# Patient Record
Sex: MALE | Race: WHITE | NOT HISPANIC OR LATINO | Employment: OTHER | URBAN - METROPOLITAN AREA
[De-identification: names, ages, dates, MRNs, and addresses within clinical notes are randomized per-mention and may not be internally consistent; named-entity substitution may affect disease eponyms.]

---
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
## 2023-11-21 RX ORDER — ACETAMINOPHEN 500 MG
500-1000 TABLET ORAL EVERY 6 HOURS PRN
Status: ON HOLD | COMMUNITY
End: 2023-12-01

## 2023-11-21 RX ORDER — FINASTERIDE 5 MG/1
5 TABLET, FILM COATED ORAL DAILY
COMMUNITY
End: 2023-11-21

## 2023-11-21 RX ORDER — PROPRANOLOL HCL 60 MG
60 CAPSULE, EXTENDED RELEASE 24HR ORAL DAILY
COMMUNITY

## 2023-11-21 RX ORDER — OLOPATADINE HYDROCHLORIDE 2 MG/ML
1 SOLUTION/ DROPS OPHTHALMIC DAILY
COMMUNITY
End: 2023-11-21

## 2023-11-21 RX ORDER — TIMOLOL MALEATE 5 MG/ML
1 SOLUTION/ DROPS OPHTHALMIC DAILY
COMMUNITY

## 2023-11-21 RX ORDER — FUROSEMIDE 20 MG
20 TABLET ORAL DAILY PRN
COMMUNITY

## 2023-11-21 RX ORDER — TAMSULOSIN HYDROCHLORIDE 0.4 MG/1
0.4 CAPSULE ORAL EVERY EVENING
COMMUNITY

## 2023-11-21 RX ORDER — ROSUVASTATIN CALCIUM 20 MG/1
20 TABLET, COATED ORAL DAILY
COMMUNITY
End: 2023-11-21

## 2023-11-21 NOTE — PROVIDER NOTIFICATION
Scheduled for left total knee arthroplasty 11/30/23 with Dr. Montero at [facility].    Informed Dr. Montero's team that Patient reports a small blister on his right toe that he has had for 1 week. His surgery is on the left knee and the blister is on the right foot. Dr. Montero will proceed with surgery as planned.    Annita Turner RN

## 2023-11-21 NOTE — PROGRESS NOTES
"Planning to discharge home on POD 1 in the morning with his son staying with him after surgery. He also lives with his wife.       11/21/23 1411   Discharge Planning   Patient/Family Anticipates Transition to home with family   Concerns to be Addressed all concerns addressed in this encounter   Living Arrangements   People in Home spouse   Type of Residence Private Residence   Is your private residence a single family home or apartment? Apartment  (independent senior apt)   Number of Stairs, Within Home, Primary none  (elevator)   Once home, are you able to live on one level? Yes   Which level? Main Level   Bathroom Shower/Tub Walk-in shower  (Have both tub and walk-in shower)   Equipment Currently Used at Home cane, straight;grab bar, tub/shower;grab bar, toilet;shower chair  (Has walkers at home)   Support System   Support Systems Spouse/Significant Other;Children  (wife, Yoly, and son, \"Pip\" Orville, and daughter, Annita. His son will be coming from out of state to stay with them after surgery.)   Do you have someone available to stay with you one or two nights once you are home? Yes   Education   Patient attended total joint pre-op class/received pre-op teaching  email/phone call       "

## 2023-11-30 ENCOUNTER — HOSPITAL ENCOUNTER (OUTPATIENT)
Facility: CLINIC | Age: 88
Discharge: HOME | End: 2023-12-01
Attending: STUDENT IN AN ORGANIZED HEALTH CARE EDUCATION/TRAINING PROGRAM | Admitting: STUDENT IN AN ORGANIZED HEALTH CARE EDUCATION/TRAINING PROGRAM
Payer: COMMERCIAL

## 2023-11-30 ENCOUNTER — APPOINTMENT (OUTPATIENT)
Dept: RADIOLOGY | Facility: CLINIC | Age: 88
End: 2023-11-30
Attending: STUDENT IN AN ORGANIZED HEALTH CARE EDUCATION/TRAINING PROGRAM
Payer: COMMERCIAL

## 2023-11-30 ENCOUNTER — ANESTHESIA (OUTPATIENT)
Dept: SURGERY | Facility: CLINIC | Age: 88
End: 2023-11-30
Payer: COMMERCIAL

## 2023-11-30 ENCOUNTER — ANESTHESIA EVENT (OUTPATIENT)
Dept: SURGERY | Facility: CLINIC | Age: 88
End: 2023-11-30
Payer: COMMERCIAL

## 2023-11-30 DIAGNOSIS — Z96.652 S/P TOTAL KNEE REPLACEMENT, LEFT: Primary | ICD-10-CM

## 2023-11-30 LAB — POTASSIUM SERPL-SCNC: 3.7 MMOL/L (ref 3.4–5.3)

## 2023-11-30 PROCEDURE — 250N000013 HC RX MED GY IP 250 OP 250 PS 637: Performed by: STUDENT IN AN ORGANIZED HEALTH CARE EDUCATION/TRAINING PROGRAM

## 2023-11-30 PROCEDURE — 999N000141 HC STATISTIC PRE-PROCEDURE NURSING ASSESSMENT: Performed by: STUDENT IN AN ORGANIZED HEALTH CARE EDUCATION/TRAINING PROGRAM

## 2023-11-30 PROCEDURE — 360N000077 HC SURGERY LEVEL 4, PER MIN: Performed by: STUDENT IN AN ORGANIZED HEALTH CARE EDUCATION/TRAINING PROGRAM

## 2023-11-30 PROCEDURE — 250N000011 HC RX IP 250 OP 636: Performed by: ANESTHESIOLOGY

## 2023-11-30 PROCEDURE — 258N000003 HC RX IP 258 OP 636: Performed by: ANESTHESIOLOGY

## 2023-11-30 PROCEDURE — 250N000011 HC RX IP 250 OP 636: Performed by: PHYSICIAN ASSISTANT

## 2023-11-30 PROCEDURE — 258N000003 HC RX IP 258 OP 636: Performed by: STUDENT IN AN ORGANIZED HEALTH CARE EDUCATION/TRAINING PROGRAM

## 2023-11-30 PROCEDURE — C1713 ANCHOR/SCREW BN/BN,TIS/BN: HCPCS | Performed by: STUDENT IN AN ORGANIZED HEALTH CARE EDUCATION/TRAINING PROGRAM

## 2023-11-30 PROCEDURE — 250N000009 HC RX 250: Performed by: ANESTHESIOLOGY

## 2023-11-30 PROCEDURE — 84132 ASSAY OF SERUM POTASSIUM: CPT | Performed by: NURSE PRACTITIONER

## 2023-11-30 PROCEDURE — 250N000009 HC RX 250: Performed by: STUDENT IN AN ORGANIZED HEALTH CARE EDUCATION/TRAINING PROGRAM

## 2023-11-30 PROCEDURE — 258N000001 HC RX 258: Performed by: STUDENT IN AN ORGANIZED HEALTH CARE EDUCATION/TRAINING PROGRAM

## 2023-11-30 PROCEDURE — 250N000011 HC RX IP 250 OP 636: Mod: JZ | Performed by: NURSE ANESTHETIST, CERTIFIED REGISTERED

## 2023-11-30 PROCEDURE — 73560 X-RAY EXAM OF KNEE 1 OR 2: CPT | Mod: LT

## 2023-11-30 PROCEDURE — 36415 COLL VENOUS BLD VENIPUNCTURE: CPT | Performed by: NURSE PRACTITIONER

## 2023-11-30 PROCEDURE — 250N000011 HC RX IP 250 OP 636: Mod: JZ | Performed by: STUDENT IN AN ORGANIZED HEALTH CARE EDUCATION/TRAINING PROGRAM

## 2023-11-30 PROCEDURE — 370N000017 HC ANESTHESIA TECHNICAL FEE, PER MIN: Performed by: STUDENT IN AN ORGANIZED HEALTH CARE EDUCATION/TRAINING PROGRAM

## 2023-11-30 PROCEDURE — C1776 JOINT DEVICE (IMPLANTABLE): HCPCS | Performed by: STUDENT IN AN ORGANIZED HEALTH CARE EDUCATION/TRAINING PROGRAM

## 2023-11-30 PROCEDURE — 710N000010 HC RECOVERY PHASE 1, LEVEL 2, PER MIN: Performed by: STUDENT IN AN ORGANIZED HEALTH CARE EDUCATION/TRAINING PROGRAM

## 2023-11-30 PROCEDURE — 258N000003 HC RX IP 258 OP 636: Performed by: NURSE ANESTHETIST, CERTIFIED REGISTERED

## 2023-11-30 PROCEDURE — 999N000065 XR KNEE PORT LEFT 1/2 VIEWS

## 2023-11-30 PROCEDURE — 250N000011 HC RX IP 250 OP 636: Mod: JZ | Performed by: ANESTHESIOLOGY

## 2023-11-30 PROCEDURE — 272N000001 HC OR GENERAL SUPPLY STERILE: Performed by: STUDENT IN AN ORGANIZED HEALTH CARE EDUCATION/TRAINING PROGRAM

## 2023-11-30 PROCEDURE — 99222 1ST HOSP IP/OBS MODERATE 55: CPT | Mod: AI | Performed by: INTERNAL MEDICINE

## 2023-11-30 PROCEDURE — 250N000009 HC RX 250: Performed by: PHYSICIAN ASSISTANT

## 2023-11-30 PROCEDURE — 250N000013 HC RX MED GY IP 250 OP 250 PS 637: Performed by: PHYSICIAN ASSISTANT

## 2023-11-30 DEVICE — ATTUNE PATELLA MEDIALIZED DOME 38MM CEMENTED AOX
Type: IMPLANTABLE DEVICE | Site: KNEE | Status: FUNCTIONAL
Brand: ATTUNE

## 2023-11-30 DEVICE — ATTUNE KNEE SYSTEM FEMORAL POSTERIOR STABILIZED SIZE 7 LEFT CEMENTED
Type: IMPLANTABLE DEVICE | Site: KNEE | Status: FUNCTIONAL
Brand: ATTUNE

## 2023-11-30 DEVICE — FULL DOSE BONE CEMENT, 10 PACK CATALOG NUMBER IS 6191-1-010
Type: IMPLANTABLE DEVICE | Site: KNEE | Status: FUNCTIONAL
Brand: SIMPLEX

## 2023-11-30 DEVICE — ATTUNE KNEE SYSTEM TIBIAL BASE FIXED BEARING SIZE 7 CEMENTED
Type: IMPLANTABLE DEVICE | Site: KNEE | Status: FUNCTIONAL
Brand: ATTUNE

## 2023-11-30 RX ORDER — ASPIRIN 325 MG
325 TABLET, DELAYED RELEASE (ENTERIC COATED) ORAL DAILY
Status: DISCONTINUED | OUTPATIENT
Start: 2023-11-30 | End: 2023-12-01 | Stop reason: HOSPADM

## 2023-11-30 RX ORDER — CEFAZOLIN SODIUM 2 G/100ML
2 INJECTION, SOLUTION INTRAVENOUS EVERY 8 HOURS
Qty: 200 ML | Refills: 0 | Status: COMPLETED | OUTPATIENT
Start: 2023-11-30 | End: 2023-12-01

## 2023-11-30 RX ORDER — AMOXICILLIN 250 MG
1 CAPSULE ORAL 2 TIMES DAILY
Status: DISCONTINUED | OUTPATIENT
Start: 2023-11-30 | End: 2023-12-01 | Stop reason: HOSPADM

## 2023-11-30 RX ORDER — NALOXONE HYDROCHLORIDE 0.4 MG/ML
0.2 INJECTION, SOLUTION INTRAMUSCULAR; INTRAVENOUS; SUBCUTANEOUS
Status: DISCONTINUED | OUTPATIENT
Start: 2023-11-30 | End: 2023-12-01 | Stop reason: HOSPADM

## 2023-11-30 RX ORDER — ONDANSETRON 2 MG/ML
4 INJECTION INTRAMUSCULAR; INTRAVENOUS EVERY 6 HOURS PRN
Status: DISCONTINUED | OUTPATIENT
Start: 2023-11-30 | End: 2023-12-01 | Stop reason: HOSPADM

## 2023-11-30 RX ORDER — CEFAZOLIN SODIUM/WATER 2 G/20 ML
2 SYRINGE (ML) INTRAVENOUS
Status: DISCONTINUED | OUTPATIENT
Start: 2023-11-30 | End: 2023-11-30

## 2023-11-30 RX ORDER — BISACODYL 10 MG
10 SUPPOSITORY, RECTAL RECTAL DAILY PRN
Status: DISCONTINUED | OUTPATIENT
Start: 2023-11-30 | End: 2023-12-01 | Stop reason: HOSPADM

## 2023-11-30 RX ORDER — OXYCODONE HYDROCHLORIDE 5 MG/1
5 TABLET ORAL EVERY 4 HOURS PRN
Qty: 20 TABLET | Refills: 0 | Status: SHIPPED | OUTPATIENT
Start: 2023-11-30

## 2023-11-30 RX ORDER — HYDROMORPHONE HCL IN WATER/PF 6 MG/30 ML
0.2 PATIENT CONTROLLED ANALGESIA SYRINGE INTRAVENOUS
Status: DISCONTINUED | OUTPATIENT
Start: 2023-11-30 | End: 2023-12-01 | Stop reason: HOSPADM

## 2023-11-30 RX ORDER — LIDOCAINE 40 MG/G
CREAM TOPICAL
Status: DISCONTINUED | OUTPATIENT
Start: 2023-11-30 | End: 2023-11-30 | Stop reason: HOSPADM

## 2023-11-30 RX ORDER — FENTANYL CITRATE 50 UG/ML
50 INJECTION, SOLUTION INTRAMUSCULAR; INTRAVENOUS
Status: DISCONTINUED | OUTPATIENT
Start: 2023-11-30 | End: 2023-11-30 | Stop reason: HOSPADM

## 2023-11-30 RX ORDER — CEFAZOLIN SODIUM/WATER 2 G/20 ML
2 SYRINGE (ML) INTRAVENOUS SEE ADMIN INSTRUCTIONS
Status: DISCONTINUED | OUTPATIENT
Start: 2023-11-30 | End: 2023-11-30 | Stop reason: HOSPADM

## 2023-11-30 RX ORDER — SODIUM CHLORIDE, SODIUM LACTATE, POTASSIUM CHLORIDE, CALCIUM CHLORIDE 600; 310; 30; 20 MG/100ML; MG/100ML; MG/100ML; MG/100ML
INJECTION, SOLUTION INTRAVENOUS CONTINUOUS
Status: DISCONTINUED | OUTPATIENT
Start: 2023-11-30 | End: 2023-12-01 | Stop reason: HOSPADM

## 2023-11-30 RX ORDER — ACETAMINOPHEN 325 MG/1
975 TABLET ORAL EVERY 8 HOURS
Qty: 27 TABLET | Refills: 0 | Status: DISCONTINUED | OUTPATIENT
Start: 2023-11-30 | End: 2023-12-01 | Stop reason: HOSPADM

## 2023-11-30 RX ORDER — SODIUM CHLORIDE, SODIUM LACTATE, POTASSIUM CHLORIDE, CALCIUM CHLORIDE 600; 310; 30; 20 MG/100ML; MG/100ML; MG/100ML; MG/100ML
INJECTION, SOLUTION INTRAVENOUS CONTINUOUS
Status: DISCONTINUED | OUTPATIENT
Start: 2023-11-30 | End: 2023-11-30 | Stop reason: HOSPADM

## 2023-11-30 RX ORDER — TRANEXAMIC ACID 650 MG/1
1950 TABLET ORAL ONCE
Status: COMPLETED | OUTPATIENT
Start: 2023-11-30 | End: 2023-11-30

## 2023-11-30 RX ORDER — AMOXICILLIN 250 MG
1-2 CAPSULE ORAL 2 TIMES DAILY
Qty: 30 TABLET | Refills: 0 | Status: SHIPPED | OUTPATIENT
Start: 2023-11-30

## 2023-11-30 RX ORDER — TIMOLOL MALEATE 5 MG/ML
1 SOLUTION/ DROPS OPHTHALMIC DAILY
Status: DISCONTINUED | OUTPATIENT
Start: 2023-11-30 | End: 2023-12-01 | Stop reason: HOSPADM

## 2023-11-30 RX ORDER — NALOXONE HYDROCHLORIDE 0.4 MG/ML
0.4 INJECTION, SOLUTION INTRAMUSCULAR; INTRAVENOUS; SUBCUTANEOUS
Status: DISCONTINUED | OUTPATIENT
Start: 2023-11-30 | End: 2023-12-01 | Stop reason: HOSPADM

## 2023-11-30 RX ORDER — ONDANSETRON 4 MG/1
4 TABLET, ORALLY DISINTEGRATING ORAL EVERY 30 MIN PRN
Status: DISCONTINUED | OUTPATIENT
Start: 2023-11-30 | End: 2023-11-30 | Stop reason: HOSPADM

## 2023-11-30 RX ORDER — FENTANYL CITRATE 50 UG/ML
25 INJECTION, SOLUTION INTRAMUSCULAR; INTRAVENOUS EVERY 5 MIN PRN
Status: DISCONTINUED | OUTPATIENT
Start: 2023-11-30 | End: 2023-11-30 | Stop reason: HOSPADM

## 2023-11-30 RX ORDER — ACETAMINOPHEN 325 MG/1
650 TABLET ORAL EVERY 4 HOURS PRN
Status: DISCONTINUED | OUTPATIENT
Start: 2023-12-03 | End: 2023-12-01 | Stop reason: HOSPADM

## 2023-11-30 RX ORDER — DEXAMETHASONE SODIUM PHOSPHATE 10 MG/ML
INJECTION, SOLUTION INTRAMUSCULAR; INTRAVENOUS PRN
Status: DISCONTINUED | OUTPATIENT
Start: 2023-11-30 | End: 2023-11-30

## 2023-11-30 RX ORDER — FUROSEMIDE 20 MG
20 TABLET ORAL DAILY PRN
Status: DISCONTINUED | OUTPATIENT
Start: 2023-11-30 | End: 2023-12-01 | Stop reason: HOSPADM

## 2023-11-30 RX ORDER — TAMSULOSIN HYDROCHLORIDE 0.4 MG/1
0.4 CAPSULE ORAL EVERY EVENING
Status: DISCONTINUED | OUTPATIENT
Start: 2023-11-30 | End: 2023-12-01 | Stop reason: HOSPADM

## 2023-11-30 RX ORDER — HYDROMORPHONE HCL IN WATER/PF 6 MG/30 ML
0.2 PATIENT CONTROLLED ANALGESIA SYRINGE INTRAVENOUS EVERY 5 MIN PRN
Status: DISCONTINUED | OUTPATIENT
Start: 2023-11-30 | End: 2023-11-30 | Stop reason: HOSPADM

## 2023-11-30 RX ORDER — LIDOCAINE 40 MG/G
CREAM TOPICAL
Status: DISCONTINUED | OUTPATIENT
Start: 2023-11-30 | End: 2023-12-01 | Stop reason: HOSPADM

## 2023-11-30 RX ORDER — HYDROMORPHONE HCL IN WATER/PF 6 MG/30 ML
0.4 PATIENT CONTROLLED ANALGESIA SYRINGE INTRAVENOUS EVERY 5 MIN PRN
Status: DISCONTINUED | OUTPATIENT
Start: 2023-11-30 | End: 2023-11-30 | Stop reason: HOSPADM

## 2023-11-30 RX ORDER — METHOCARBAMOL 500 MG/1
500 TABLET, FILM COATED ORAL EVERY 6 HOURS PRN
Status: DISCONTINUED | OUTPATIENT
Start: 2023-11-30 | End: 2023-12-01 | Stop reason: HOSPADM

## 2023-11-30 RX ORDER — FENTANYL CITRATE 50 UG/ML
50 INJECTION, SOLUTION INTRAMUSCULAR; INTRAVENOUS EVERY 5 MIN PRN
Status: DISCONTINUED | OUTPATIENT
Start: 2023-11-30 | End: 2023-11-30 | Stop reason: HOSPADM

## 2023-11-30 RX ORDER — POLYETHYLENE GLYCOL 3350 17 G/17G
17 POWDER, FOR SOLUTION ORAL DAILY
Status: DISCONTINUED | OUTPATIENT
Start: 2023-12-01 | End: 2023-12-01 | Stop reason: HOSPADM

## 2023-11-30 RX ORDER — OXYCODONE HYDROCHLORIDE 5 MG/1
5 TABLET ORAL EVERY 4 HOURS PRN
Status: DISCONTINUED | OUTPATIENT
Start: 2023-11-30 | End: 2023-12-01 | Stop reason: HOSPADM

## 2023-11-30 RX ORDER — HYDROMORPHONE HCL IN WATER/PF 6 MG/30 ML
0.1 PATIENT CONTROLLED ANALGESIA SYRINGE INTRAVENOUS
Status: DISCONTINUED | OUTPATIENT
Start: 2023-11-30 | End: 2023-12-01 | Stop reason: HOSPADM

## 2023-11-30 RX ORDER — ASPIRIN 325 MG
325 TABLET, DELAYED RELEASE (ENTERIC COATED) ORAL DAILY
Qty: 40 TABLET | Refills: 0 | Status: SHIPPED | OUTPATIENT
Start: 2023-11-30

## 2023-11-30 RX ORDER — PROPRANOLOL HCL 60 MG
60 CAPSULE, EXTENDED RELEASE 24HR ORAL DAILY
Status: DISCONTINUED | OUTPATIENT
Start: 2023-11-30 | End: 2023-12-01 | Stop reason: HOSPADM

## 2023-11-30 RX ORDER — PROPOFOL 10 MG/ML
INJECTION, EMULSION INTRAVENOUS CONTINUOUS PRN
Status: DISCONTINUED | OUTPATIENT
Start: 2023-11-30 | End: 2023-11-30

## 2023-11-30 RX ORDER — ONDANSETRON 4 MG/1
4 TABLET, ORALLY DISINTEGRATING ORAL EVERY 6 HOURS PRN
Status: DISCONTINUED | OUTPATIENT
Start: 2023-11-30 | End: 2023-12-01 | Stop reason: HOSPADM

## 2023-11-30 RX ORDER — ACETAMINOPHEN 325 MG/1
650 TABLET ORAL EVERY 4 HOURS PRN
Qty: 100 TABLET | Refills: 0 | Status: SHIPPED | OUTPATIENT
Start: 2023-11-30

## 2023-11-30 RX ORDER — ONDANSETRON 2 MG/ML
4 INJECTION INTRAMUSCULAR; INTRAVENOUS EVERY 30 MIN PRN
Status: DISCONTINUED | OUTPATIENT
Start: 2023-11-30 | End: 2023-11-30 | Stop reason: HOSPADM

## 2023-11-30 RX ORDER — FENTANYL CITRATE 50 UG/ML
100 INJECTION, SOLUTION INTRAMUSCULAR; INTRAVENOUS
Status: DISCONTINUED | OUTPATIENT
Start: 2023-11-30 | End: 2023-11-30 | Stop reason: HOSPADM

## 2023-11-30 RX ORDER — ONDANSETRON 2 MG/ML
INJECTION INTRAMUSCULAR; INTRAVENOUS PRN
Status: DISCONTINUED | OUTPATIENT
Start: 2023-11-30 | End: 2023-11-30

## 2023-11-30 RX ORDER — HYDROXYZINE HYDROCHLORIDE 10 MG/1
10 TABLET, FILM COATED ORAL EVERY 6 HOURS PRN
Status: DISCONTINUED | OUTPATIENT
Start: 2023-11-30 | End: 2023-12-01 | Stop reason: HOSPADM

## 2023-11-30 RX ORDER — BUPIVACAINE HYDROCHLORIDE 5 MG/ML
INJECTION, SOLUTION EPIDURAL; INTRACAUDAL
Status: COMPLETED | OUTPATIENT
Start: 2023-11-30 | End: 2023-11-30

## 2023-11-30 RX ORDER — PROCHLORPERAZINE MALEATE 5 MG
5 TABLET ORAL EVERY 6 HOURS PRN
Status: DISCONTINUED | OUTPATIENT
Start: 2023-11-30 | End: 2023-12-01 | Stop reason: HOSPADM

## 2023-11-30 RX ADMIN — SENNOSIDES AND DOCUSATE SODIUM 1 TABLET: 8.6; 5 TABLET ORAL at 19:35

## 2023-11-30 RX ADMIN — TIMOLOL MALEATE 1 DROP: 5 SOLUTION OPHTHALMIC at 19:36

## 2023-11-30 RX ADMIN — TRANEXAMIC ACID 1950 MG: 650 TABLET ORAL at 13:14

## 2023-11-30 RX ADMIN — ONDANSETRON 4 MG: 2 INJECTION INTRAMUSCULAR; INTRAVENOUS at 15:11

## 2023-11-30 RX ADMIN — SODIUM CHLORIDE, POTASSIUM CHLORIDE, SODIUM LACTATE AND CALCIUM CHLORIDE: 600; 310; 30; 20 INJECTION, SOLUTION INTRAVENOUS at 15:54

## 2023-11-30 RX ADMIN — SODIUM CHLORIDE, POTASSIUM CHLORIDE, SODIUM LACTATE AND CALCIUM CHLORIDE: 600; 310; 30; 20 INJECTION, SOLUTION INTRAVENOUS at 23:28

## 2023-11-30 RX ADMIN — MIDAZOLAM HYDROCHLORIDE 0.5 MG: 1 INJECTION, SOLUTION INTRAMUSCULAR; INTRAVENOUS at 13:44

## 2023-11-30 RX ADMIN — FENTANYL CITRATE 25 MCG: 50 INJECTION, SOLUTION INTRAMUSCULAR; INTRAVENOUS at 13:43

## 2023-11-30 RX ADMIN — SODIUM CHLORIDE, POTASSIUM CHLORIDE, SODIUM LACTATE AND CALCIUM CHLORIDE: 600; 310; 30; 20 INJECTION, SOLUTION INTRAVENOUS at 13:14

## 2023-11-30 RX ADMIN — TAMSULOSIN HYDROCHLORIDE 0.4 MG: 0.4 CAPSULE ORAL at 19:35

## 2023-11-30 RX ADMIN — OXYCODONE HYDROCHLORIDE 5 MG: 5 TABLET ORAL at 19:36

## 2023-11-30 RX ADMIN — BUPIVACAINE HYDROCHLORIDE 15 ML: 5 INJECTION, SOLUTION EPIDURAL; INTRACAUDAL; PERINEURAL at 13:48

## 2023-11-30 RX ADMIN — PROPOFOL 50 MCG/KG/MIN: 10 INJECTION, EMULSION INTRAVENOUS at 14:55

## 2023-11-30 RX ADMIN — CEFAZOLIN SODIUM 2 G: 2 INJECTION, SOLUTION INTRAVENOUS at 21:59

## 2023-11-30 RX ADMIN — Medication 2 G: at 14:46

## 2023-11-30 RX ADMIN — PROPRANOLOL HYDROCHLORIDE 60 MG: 60 CAPSULE, EXTENDED RELEASE ORAL at 19:34

## 2023-11-30 RX ADMIN — ASPIRIN 325 MG: 325 TABLET, DELAYED RELEASE ORAL at 19:35

## 2023-11-30 RX ADMIN — LIDOCAINE HYDROCHLORIDE 30 MG: 10 INJECTION, SOLUTION EPIDURAL; INFILTRATION; INTRACAUDAL; PERINEURAL at 14:55

## 2023-11-30 RX ADMIN — DEXAMETHASONE SODIUM PHOSPHATE 4 MG: 10 INJECTION, SOLUTION INTRAMUSCULAR; INTRAVENOUS at 15:11

## 2023-11-30 RX ADMIN — PHENYLEPHRINE HYDROCHLORIDE 0.2 MCG/KG/MIN: 10 INJECTION INTRAVENOUS at 15:01

## 2023-11-30 RX ADMIN — MEPIVACAINE HYDROCHLORIDE 3 ML: 15 INJECTION, SOLUTION EPIDURAL; INFILTRATION at 14:45

## 2023-11-30 RX ADMIN — ACETAMINOPHEN 975 MG: 325 TABLET ORAL at 19:36

## 2023-11-30 ASSESSMENT — ACTIVITIES OF DAILY LIVING (ADL)
ADLS_ACUITY_SCORE: 18
ADLS_ACUITY_SCORE: 26
ADLS_ACUITY_SCORE: 24
ADLS_ACUITY_SCORE: 25

## 2023-11-30 NOTE — ANESTHESIA CARE TRANSFER NOTE
Patient: Gasper Houston    Procedure: Procedure(s):  LEFT TOTAL KNEE ARTHROPLASTY, AND HARDWARE REMOVAL       Diagnosis: Left knee pain [M25.562]  Osteoarthritis [M19.90]  Diagnosis Additional Information: No value filed.    Anesthesia Type:   Spinal     Note:    Oropharynx: spontaneously breathing  Level of Consciousness: awake  Oxygen Supplementation: room air    Independent Airway: airway patency satisfactory and stable  Dentition: dentition unchanged  Vital Signs Stable: post-procedure vital signs reviewed and stable  Report to RN Given: handoff report given  Patient transferred to: PACU    Handoff Report: Identifed the Patient, Identified the Reponsible Provider, Reviewed the pertinent medical history, Discussed the surgical course, Set expectations for post-procedure period and Allowed opportunity for questions and acknowledgement of understanding      Vitals:  Vitals Value Taken Time   /76 11/30/23 1713   Temp 97.1    Pulse 88 11/30/23 1716   Resp 32 11/30/23 1716   SpO2 100 % 11/30/23 1716   Vitals shown include unfiled device data.    Electronically Signed By: CARLOS CASSIDY CRNA  November 30, 2023  5:17 PM
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
## 2023-11-30 NOTE — ANESTHESIA PROCEDURE NOTES
"Intrathecal Procedure Note    Pre-Procedure   Staff -        Anesthesiologist:  Sarbjit Michelle MD       Performed By: anesthesiologist       Location: OR       Procedure Start/Stop Times: 11/30/2023 2:42 PM and 11/30/2023 2:45 PM       Pre-Anesthestic Checklist: patient identified, IV checked, risks and benefits discussed, informed consent, monitors and equipment checked, pre-op evaluation and at physician/surgeon's request  Timeout:       Correct Patient: Yes        Correct Procedure: Yes        Correct Site: Yes        Correct Position: Yes   Procedure Documentation  Procedure: intrathecal       Patient Position: sitting       Patient Prep/Sterile Barriers: sterile gloves, mask, patient draped       Skin prep: Chloraprep       Insertion Site: L3-4. (midline approach).       Needle Gauge: 25.        Needle Length (Inches): 3.5        Spinal Needle Type: Hanny tip       Introducer used       Introducer: 20 G       # of attempts: 2 and  # of redirects:     Assessment/Narrative         Paresthesias: No.       CSF fluid: clear.    Medication(s) Administered   1.5% Mepivacaine PF (Intrathecal) - Intrathecal   3 mL - 11/30/2023 2:45:00 PM  Medication Administration Time: 11/30/2023 2:42 PM      FOR [facility] ([facility] [place] ONLY:   Pain Team Contact information: please page the Pain Team Via [address]. Search \"Pain\". During daytime hours, please page the attending first. At night please page the resident first.      "

## 2023-11-30 NOTE — ANESTHESIA PROCEDURE NOTES
Adductor canal Procedure Note    Pre-Procedure   Staff -        Anesthesiologist:  Sarbjit Michelle MD       Performed By: anesthesiologist       Location: pre-op       Procedure Start/Stop Times: 11/30/2023 1:46 PM and 11/30/2023 1:48 PM       Pre-Anesthestic Checklist: patient identified, IV checked, site marked, risks and benefits discussed, informed consent, monitors and equipment checked, pre-op evaluation, at physician/surgeon's request and post-op pain management  Timeout:       Correct Patient: Yes        Correct Procedure: Yes        Correct Site: Yes        Correct Position: Yes        Correct Laterality: Yes        Site Marked: Yes  Procedure Documentation  Procedure: Adductor canal       Laterality: left       Patient Position: supine       Patient Prep/Sterile Barriers: sterile gloves, mask       Skin prep: Chloraprep       Needle Type: short bevel       Needle Gauge: 20.        Needle Length (Inches): 4        Ultrasound guided       1. Ultrasound was used to identify targeted nerve, plexus, vascular marker, or fascial plane and place a needle adjacent to it in real-time.       2. Ultrasound was used to visualize the spread of anesthetic in close proximity to the above referenced structure.       3. A permanent image is entered into the patient's record.       4. The visualized anatomic structures appeared normal.       5. There were no apparent abnormal pathologic findings.    Assessment/Narrative         The placement was positive for aspirated blood (on initial needle placement, resolved after repositioning).       The placement was negative for: painful injection and site bleeding       Paresthesias: No.       Bolus given via needle..        Secured via.        Insertion/Infusion Method: Single Shot       Complications: none       Injection made incrementally with aspirations every 5 mL.    Medication(s) Administered   Bupivacaine 0.5% PF (Infiltration) - Infiltration   15 mL - 11/30/2023  "1:48:00 PM  Medication Administration Time: 11/30/2023 1:46 PM      FOR [facility] ([facility]) ONLY:   Pain Team Contact information: please page the Pain Team Via [employer]. Search \"Pain\". During daytime hours, please page the attending first. At night please page the resident first.      "

## 2023-11-30 NOTE — PHARMACY-ADMISSION MEDICATION HISTORY
Pharmacist ASHA Medication History    Admission medication history is complete. The information provided in this note is only as accurate as the sources available at the time of the update.    Medication reconciliation/reorder completed by provider prior to medication history? No    Information Source(s): Patient and Clinic records and Care Everywhere via in-person    Pertinent Information: N/A    Medication Affordability:  Not including over the counter (OTC) medications, was there a time in the past 3 months when you did not take your medications as prescribed because of cost?: No    Allergies reviewed with patient and updates made in EHR: yes    Medications available for use during hospital stay: None.      Medication History Completed By: Tha Becerril [facility] 11/30/2023 1:05 PM    PTA Med List   Medication Sig Last Dose    acetaminophen (TYLENOL) 500 MG tablet Take 500-1,000 mg by mouth every 6 hours as needed for mild pain Unknown    furosemide (LASIX) 20 MG tablet Take 20 mg by mouth daily as needed (For edema or shortness of breath) More than a month    propranolol ER (INDERAL LA) 60 MG 24 hr capsule Take 60 mg by mouth daily 11/29/2023 at AM    tamsulosin (FLOMAX) 0.4 MG capsule Take 0.4 mg by mouth every evening 11/29/2023 at PM    timolol maleate (TIMOPTIC) 0.5 % ophthalmic solution Place 1 drop into both eyes daily 11/29/2023 at AM, not with

## 2023-11-30 NOTE — OR NURSING
The following components were explanted from the LEFT KNEE by Dr. Montero on 11/30/23 and discarded of per hospital policy:    Screw & Washer- Lateral  Screw- Proximal

## 2023-11-30 NOTE — OP NOTE
DATE OF SURGERY: 11/30/2023  PREOPERATIVE DIAGNOSIS:  post traumatic left knee osteoarthritis s/p reconstructive surgery   POSTOPERATIVE DIAGNOSIS:  post traumatic left knee osteoarthritis s/p reconstructive surgery      PROCEDURE:  left total knee arthroplasty, removal of deep hardware     SURGEON: Stephane Montero MD  ASSISTANT:  Rani Pretty PA-C. A skilled assistant was necessary for all portions of the surgery due to its complexity including exposure, retraction, bone resection, trailing, implant cementation and closure.     ANESTHESIA:  spinal  TOURNIQUET TIME:   NONE  ESTIMATED BLOOD LOSS:  100 ml    SPECIMENS:  None.   DRAINS:  None.   COMPLICATIONS:  None apparent.     INTRAOPERATIVE FINDINGS:  end stage osteoarthritis     IMPLANTS:  depuy synthes attune  - 7 PS femur   - 7 tibia   - 38 patella  - 7 PS poly    BRIEF HISTORY:  89 year old male who has had longstanding knee arthritis.  Many nonoperative treatment modalities were tried, however, this did not provide longstanding relief of pain.  Risk benefits alternatives to total knee arthroplasty were reviewed with the patient.  Discussed advantages of increasing mobility and improving quality of life by minimizing pain and improving function.  We discussed risks of surgery include infection, blood clot, stiffness, risk of anesthesia.  We discussed the postoperative recovery process.  Patient elected to proceed with surgery.    DESCRIPTION OF PROCEDURE:  The patient was identified in the preoperative holding area.  The informed consent was reviewed. The operative site was identified and marked. The patient was brought to the operating room and anesthesia was induced.  Tourniquet was applied to the operative thigh and a bump was placed over the hip.  The operative extremity was prepped and draped in the usual sterile fashion.  IV antibiotics and tranexamic acid were given prior to incision.  Surgical timeout was completed.    A midline incision was made  across the front of the knee and taken down to the extensor mechanism.  Small skin flaps were elevated on the medial lateral sides.  A medial parapatellar arthrotomy was made and the patella was everted.  Hemostasis was obtained after arthrotomy.  A medial release was performed off the proximal tibia. Fat pad was excised from behind the patellar tendon.  Synovium in the suprapatellar pouch was excised to reveal the anterior cortex of the femur.  Freehand cut was made on the patella to ensure at least 13 or more millimeters of residual patella.  The patella was sized, and lug holes were drilled.  The knee was flexed and the anterior horn of the meniscus was released as was the tibial attachment of the ACL.  PCL was resected off the femoral attachment. Retractors were placed medially and laterally in the distal femur was opened with a step reamer.  An intramedullary guide was used for the distal femoral cutting block and the distal femur was cut.  The 4-in-1 cutting block was applied using the stylus on the anterior cortex.  Care was taken not to internally rotate the femoral cutting block using Whitesides line and the epicondylar axis as landmarks.  The 4-in-1 cutting block was used to complete all four cuts, bone fragments were removed.  The lamina  was used to remove medial and lateral meniscus as well as any posterior osteophytes.  A box cutting guide was applied in the middle the femur or slightly lateral.  Box was cut and removed.  Trial components were placed and the knee was trialed.  Evaluation of the stability was made in flexion and extension.  The rotation of the tibia was marked.  Patellar tracking was evaluated.  With good stability and mobility of the knee final implants were opened.  Femoral trial components were removed and the tibia was punched and drilled.  The knee was irrigated thoroughly to remove marrow any clot from the bone surfaces. Final components were cemented into place and the  knee was placed in full extension with a trial polyethylene to allow the cement to cure.  Betadine solution was allowed to soak in the knee for 3 minutes and periarticular block was completed.  Hemostasis was obtained.  The knee was thoroughly irrigated and the stability was again checked.  A final polyethylene was placed, size 7.   The knee was thoroughly irrigated.  The arthrotomy was closed with a few nonabsorbable #1 Ethibond sutures followed by a running strata fix 0 suture.  Subcutaneous tissue was closed with 2-0 monocryl followed by 3-0 running monocryl stratafix for the skin.  Steri-Strips was applied followed by an Aquacel dressing. All sponge and needle counts were correct at the end of the case.      POSTOPERATIVE PLAN:    Patient will be admitted to the coronel for pain control and therapy.  Weightbearing as tolerated.  IV antibiotics for 24 hours.  Aspirin 325 mg daily for 6 weeks for DVT prophylaxis.  Follow-up will be in 14 days for a wound check.  Surgical dressing can stay in place until follow-up appointment.      Stephane Montero MD

## 2023-11-30 NOTE — INTERVAL H&P NOTE
I have reviewed the surgical (or preoperative) H&P that is linked to this encounter, and examined the patient. There are no significant changes    Stephane Montero MD
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
## 2023-12-01 ENCOUNTER — APPOINTMENT (OUTPATIENT)
Dept: PHYSICAL THERAPY | Facility: CLINIC | Age: 88
End: 2023-12-01
Attending: STUDENT IN AN ORGANIZED HEALTH CARE EDUCATION/TRAINING PROGRAM
Payer: COMMERCIAL

## 2023-12-01 VITALS
WEIGHT: 180 LBS | BODY MASS INDEX: 25.2 KG/M2 | SYSTOLIC BLOOD PRESSURE: 118 MMHG | DIASTOLIC BLOOD PRESSURE: 58 MMHG | OXYGEN SATURATION: 95 % | RESPIRATION RATE: 16 BRPM | TEMPERATURE: 98 F | HEART RATE: 91 BPM | HEIGHT: 71 IN

## 2023-12-01 PROBLEM — E87.5 HYPERKALEMIA: Status: ACTIVE | Noted: 2023-12-01

## 2023-12-01 PROBLEM — R11.2 NAUSEA WITH VOMITING: Status: ACTIVE | Noted: 2023-12-01

## 2023-12-01 PROBLEM — G25.0 ESSENTIAL TREMOR: Status: ACTIVE | Noted: 2023-12-01

## 2023-12-01 PROBLEM — N40.0 BPH (BENIGN PROSTATIC HYPERPLASIA): Status: ACTIVE | Noted: 2023-12-01

## 2023-12-01 LAB
ANION GAP SERPL CALCULATED.3IONS-SCNC: 9 MMOL/L (ref 7–15)
BUN SERPL-MCNC: 23.2 MG/DL (ref 8–23)
CALCIUM SERPL-MCNC: 8.4 MG/DL (ref 8.2–9.6)
CHLORIDE SERPL-SCNC: 101 MMOL/L (ref 98–107)
CREAT SERPL-MCNC: 1.08 MG/DL (ref 0.67–1.17)
CREAT SERPL-MCNC: 1.09 MG/DL (ref 0.67–1.17)
DEPRECATED HCO3 PLAS-SCNC: 27 MMOL/L (ref 22–29)
EGFRCR SERPLBLD CKD-EPI 2021: 63 ML/MIN/1.73M2
EGFRCR SERPLBLD CKD-EPI 2021: 64 ML/MIN/1.73M2
GLUCOSE BLDC GLUCOMTR-MCNC: 114 MG/DL (ref 70–99)
GLUCOSE SERPL-MCNC: 173 MG/DL (ref 70–99)
HGB BLD-MCNC: 11.1 G/DL (ref 13.3–17.7)
MAGNESIUM SERPL-MCNC: 1.9 MG/DL (ref 1.7–2.3)
PHOSPHATE SERPL-MCNC: 3.1 MG/DL (ref 2.5–4.5)
POTASSIUM SERPL-SCNC: 4.1 MMOL/L (ref 3.4–5.3)
SODIUM SERPL-SCNC: 137 MMOL/L (ref 135–145)

## 2023-12-01 PROCEDURE — 82962 GLUCOSE BLOOD TEST: CPT

## 2023-12-01 PROCEDURE — 250N000011 HC RX IP 250 OP 636: Performed by: STUDENT IN AN ORGANIZED HEALTH CARE EDUCATION/TRAINING PROGRAM

## 2023-12-01 PROCEDURE — 250N000013 HC RX MED GY IP 250 OP 250 PS 637: Performed by: STUDENT IN AN ORGANIZED HEALTH CARE EDUCATION/TRAINING PROGRAM

## 2023-12-01 PROCEDURE — 97161 PT EVAL LOW COMPLEX 20 MIN: CPT | Mod: GP

## 2023-12-01 PROCEDURE — 97530 THERAPEUTIC ACTIVITIES: CPT | Mod: GP

## 2023-12-01 PROCEDURE — 85018 HEMOGLOBIN: CPT | Performed by: STUDENT IN AN ORGANIZED HEALTH CARE EDUCATION/TRAINING PROGRAM

## 2023-12-01 PROCEDURE — 82565 ASSAY OF CREATININE: CPT | Performed by: STUDENT IN AN ORGANIZED HEALTH CARE EDUCATION/TRAINING PROGRAM

## 2023-12-01 PROCEDURE — 99232 SBSQ HOSP IP/OBS MODERATE 35: CPT | Performed by: FAMILY MEDICINE

## 2023-12-01 PROCEDURE — 84100 ASSAY OF PHOSPHORUS: CPT | Performed by: FAMILY MEDICINE

## 2023-12-01 PROCEDURE — 999N000111 HC STATISTIC OT IP EVAL DEFER

## 2023-12-01 PROCEDURE — 97116 GAIT TRAINING THERAPY: CPT | Mod: GP

## 2023-12-01 PROCEDURE — 36415 COLL VENOUS BLD VENIPUNCTURE: CPT | Performed by: STUDENT IN AN ORGANIZED HEALTH CARE EDUCATION/TRAINING PROGRAM

## 2023-12-01 PROCEDURE — 83735 ASSAY OF MAGNESIUM: CPT | Performed by: FAMILY MEDICINE

## 2023-12-01 PROCEDURE — 82310 ASSAY OF CALCIUM: CPT | Performed by: FAMILY MEDICINE

## 2023-12-01 RX ADMIN — CEFAZOLIN SODIUM 2 G: 2 INJECTION, SOLUTION INTRAVENOUS at 05:29

## 2023-12-01 RX ADMIN — ACETAMINOPHEN 975 MG: 325 TABLET ORAL at 03:14

## 2023-12-01 RX ADMIN — POLYETHYLENE GLYCOL 3350 17 G: 17 POWDER, FOR SOLUTION ORAL at 07:46

## 2023-12-01 RX ADMIN — ONDANSETRON 4 MG: 4 TABLET, ORALLY DISINTEGRATING ORAL at 11:15

## 2023-12-01 RX ADMIN — OXYCODONE HYDROCHLORIDE 2.5 MG: 5 TABLET ORAL at 14:17

## 2023-12-01 RX ADMIN — ACETAMINOPHEN 975 MG: 325 TABLET ORAL at 11:17

## 2023-12-01 RX ADMIN — TIMOLOL MALEATE 1 DROP: 5 SOLUTION OPHTHALMIC at 07:46

## 2023-12-01 RX ADMIN — OXYCODONE HYDROCHLORIDE 5 MG: 5 TABLET ORAL at 03:14

## 2023-12-01 RX ADMIN — ASPIRIN 325 MG: 325 TABLET, DELAYED RELEASE ORAL at 07:46

## 2023-12-01 RX ADMIN — OXYCODONE HYDROCHLORIDE 2.5 MG: 5 TABLET ORAL at 07:45

## 2023-12-01 RX ADMIN — PROPRANOLOL HYDROCHLORIDE 60 MG: 60 CAPSULE, EXTENDED RELEASE ORAL at 07:46

## 2023-12-01 ASSESSMENT — ACTIVITIES OF DAILY LIVING (ADL)
ADLS_ACUITY_SCORE: 25
DEPENDENT_IADLS:: TRANSPORTATION
ADLS_ACUITY_SCORE: 25

## 2023-12-01 NOTE — PLAN OF CARE
.Occupational Therapy: Orders received. Chart reviewed and discussed with care team.? Occupational Therapy not indicated due to pt declining OT services at this time reporting that he is ind with ADLs and does not have OT concerns or needs.? Defer discharge recommendations to ortho team.? Will complete orders.

## 2023-12-01 NOTE — DISCHARGE SUMMARY
"  [facility] Orthopedics Discharge Summary                                  [facility]     PITO BUTT [id number]   Age: 89 year old  PCP: No Ref-Primary, Physician, None 11/13/1930     Date of Admission:  11/30/2023  Date of Discharge::  12/1/2023  Discharge Physician:  Vonnie Loredo PA-C    Code status:  Full Code    Admission Information:  Admission Diagnosis:  Left knee pain [M25.562]  Osteoarthritis [M19.90]  S/P total knee replacement, left [Z96.652]    Post-Operative Day: 1 Day Post-Op     Reason for admission:  The patient was admitted for the following:Procedure(s) (LRB):  LEFT TOTAL KNEE ARTHROPLASTY, AND HARDWARE REMOVAL (Left)    Principal Problem:    S/P total knee replacement, left      Allergies:  Patient has no known allergies.    Following the procedure noted above the patient was transferred to the post-op floor and started on:    Therapy:  physical therapy and occupational therapy  Anticoagulation Plan:  mg daily  for 42  days  Pain Management: : oxycodone and tylenol  Weight bearing status: Weight bearing as tolerated     The patient was followed and co-managed by the hospitalist service during the inpatient treatment course  Complications:  None  Consultations:  None      Pertinent Labs   Lab Results: personally reviewed.     No results for input(s): \"INR\", \"WBC\", \"HGB\", \"HCT\", \"MCV\", \"PLT\", \"NA\", \"CRP\" in the last 17261 hours.    Invalid input(s): \"K\", \"GLU\", \"SEDRATE\"       Discharge Information:  Condition at discharge: Stable  Discharge destination:  Discharged to home     Medications at discharge:  Current Discharge Medication List        START taking these medications    Details   aspirin (ASA) 325 MG EC tablet Take 1 tablet (325 mg) by mouth daily  Qty: 40 tablet, Refills: 0    Associated Diagnoses: S/P total knee replacement, left      oxyCODONE (ROXICODONE) 5 MG tablet Take 1 tablet (5 mg) by mouth every 4 hours as needed for moderate to severe pain  Qty: " 20 tablet, Refills: 0    Associated Diagnoses: S/P total knee replacement, left      senna-docusate (SENOKOT-S/PERICOLACE) 8.6-50 MG tablet Take 1-2 tablets by mouth 2 times daily Take while on oral narcotics to prevent or treat constipation.  Qty: 30 tablet, Refills: 0    Comments: While taking narcotics  Associated Diagnoses: S/P total knee replacement, left           CONTINUE these medications which have CHANGED    Details   acetaminophen (TYLENOL) 325 MG tablet Take 2 tablets (650 mg) by mouth every 4 hours as needed for other (mild pain)  Qty: 100 tablet, Refills: 0    Associated Diagnoses: S/P total knee replacement, left           CONTINUE these medications which have NOT CHANGED    Details   furosemide (LASIX) 20 MG tablet Take 20 mg by mouth daily as needed (For edema or shortness of breath)      propranolol ER (INDERAL LA) 60 MG 24 hr capsule Take 60 mg by mouth daily      tamsulosin (FLOMAX) 0.4 MG capsule Take 0.4 mg by mouth every evening      timolol maleate (TIMOPTIC) 0.5 % ophthalmic solution Place 1 drop into both eyes daily                        Follow-Up Care:  Patient should be seen in the office in 14 days by the Orthopedic Surgeon/Physician Assistant.  Call [phone] for appointment or questions.    Vonnie Loredo PA-C

## 2023-12-01 NOTE — PROGRESS NOTES
12/01/23 0820   Appointment Info   Signing Clinician's Name / Credentials (PT) Jordon Renteria, PT, DPT   Quick Adds   Quick Adds Certification   Living Environment   People in Home facility resident;spouse   Current Living Arrangements independent living facility   Home Accessibility no concerns   Self-Care   Usual Activity Tolerance good   Current Activity Tolerance fair   Equipment Currently Used at Home cane, straight   Fall history within last six months yes   Number of times patient has fallen within last six months 1   General Information   Onset of Illness/Injury or Date of Surgery 11/30/23   Referring Physician Dr. Montero   Patient/Family Therapy Goals Statement (PT) Decrease pain with mobility   Pertinent History of Current Problem (include personal factors and/or comorbidities that impact the POC) s/p TKA and hardware removal   Existing Precautions/Restrictions weight bearing   Weight-Bearing Status - LLE weight-bearing as tolerated   Weight-Bearing Status - RLE full weight-bearing   Range of Motion (ROM)   ROM Comment WFL, decreased LLE s/p TKA   Strength (Manual Muscle Testing)   Strength Comments WFL   Transfers   Transfers sit-stand transfer   Sit-Stand Transfer   Sit-Stand [place] (Transfers) contact guard   Assistive Device (Sit-Stand Transfers) walker, front-wheeled   Gait/Stairs (Locomotion)   [place] Level (Gait) contact guard   Assistive Device (Gait) walker, front-wheeled   Distance in Feet (Gait) 10'   Pattern (Gait) step-through   Deviations/Abnormal Patterns (Gait) antalgic;deacon decreased;stride length decreased   Clinical Impression   Criteria for Skilled Therapeutic Intervention Yes, treatment indicated   PT Diagnosis (PT) Impaired functional mobility   Influenced by the following impairments Weakness, pain   Functional limitations due to impairments Transfers, gait   Clinical Presentation (PT Evaluation Complexity) stable   Clinical Presentation Rationale Pt presents as  medically diagnosed   Clinical Decision Making (Complexity) moderate complexity   Planned Therapy Interventions (PT) gait training;home exercise program;patient/family education;ROM (range of motion);strengthening;transfer training   Risk & Benefits of therapy have been explained care plan/treatment goals reviewed;patient   PT Total Evaluation Time   PT Eval, Low Complexity Minutes (84753) 10   Therapy Certification   Start of care date 12/01/23   Certification date from 12/01/23   Certification date to 01/01/24   Medical Diagnosis s/p TKA   Physical Therapy Goals   PT Frequency Daily   PT Predicted Duration/Target Date for Goal Attainment 12/02/23   PT Goals Transfers;Gait;PT Goal 1   PT: Transfers Supervision/stand-by assist;Sit to/from stand   PT: Gait Supervision/stand-by assist;Rolling walker;50 feet   PT: Goal 1 I with TKA HEP   Interventions   Interventions Quick Adds Gait Training;Therapeutic Activity   Therapeutic Activity   Therapeutic Activities: dynamic activities to improve functional performance Minutes (67066) 15   Symptoms Noted During/After Treatment Fatigue;Increased pain   Treatment Detail/Skilled Intervention Sit<>stand CGA initially, cues for hand placement and increased time for set up. Sat pt down during amb, and then transferred sit<>stand into recliner before getting back to room. Increased time to take BP (WNL) but likely had already recovered due to time that had passed. Max A to sit<>stand on and off low seat with no arms and for safety. PT needed to use Max A to make sure pt did not fall before sitting in chair.   Gait Training   Gait Training Minutes (95329) 10   Symptoms Noted During/After Treatment (Gait Training) dizziness;fatigue;increased pain   Treatment Detail/Skilled Intervention Pt was amb moderately well in the halls CGA with FWW but then had episode of low BPs, needed seated break, was unresponsive until LEs raised for a minute or two, had amb 75' yesterday with nursing.    Distance in Feet 50'   [place] Level (Gait Training) contact guard   Physical Assistance Level (Gait Training) verbal cues;supervision   Weight Bearing (Gait Training) weight-bearing as tolerated   Assistive Device (Gait Training) rolling walker   Pattern Analysis (Gait Training) swing-through gait   Gait Analysis Deviations decreased deacon;decreased step length;decreased weight-shifting ability   Impairments (Gait Analysis/Training) pain;strength decreased   PT Discharge Planning   PT Plan Amb, transfers, TKA HEP   PT Discharge Recommendation (DC Rec)   (Defer to ortho team)   PT Rationale for DC Rec Pt had one episode of thought to be low BPs almost requiring RRT, but has been amb and transferring adequately for home otherwise, pt declined OT. Has spouse at home and plans for HH PT. If no other issues, fine with pt d/c home at this time.   PT Brief overview of current status CGA for transfers and amb 50' outside of episode of low BPs   PT Equipment Needed at Discharge   (Has FWW)   [facility]  OUTPATIENT PHYSICAL THERAPY EVALUATION  PLAN OF TREATMENT FOR OUTPATIENT REHABILITATION  (COMPLETE FOR INITIAL CLAIMS ONLY)  Patient's Last Name, First Name, M.I.  YOB: 1930  Gasper Houston                        Provider's Name  [facility] Medical Record No.  [id number]                             Onset Date:  11/30/23   Start of Care Date:  (P) 12/01/23   Type:     _X_PT   ___OT   ___SLP Medical Diagnosis:  (P) s/p TKA              PT Diagnosis:  (P) Impaired functional mobility Visits from SOC:  1     See note for plan of treatment, functional goals and certification details    I CERTIFY THE NEED FOR THESE SERVICES FURNISHED UNDER        THIS PLAN OF TREATMENT AND WHILE UNDER MY CARE     (Physician co-signature of this document indicates review and certification of the therapy plan).

## 2023-12-01 NOTE — PROVIDER NOTIFICATION
Called on call MD with [facility] Dr. Magen Tenorio and updated over the phone that lateral aspect of LLE was reinforced with ABD pad and small ace d/t moist drainage. MD aware.

## 2023-12-01 NOTE — PROGRESS NOTES
[facility] MEDICINE  PROGRESS NOTE     Code Status: Full Code  Procedure(s):  LEFT TOTAL KNEE ARTHROPLASTY,  AND HARDWARE REMOVAL  1 Day Post-Op  Identification/Summary:   Gasper Houston is a 89 year old male with a PMH of BPH, essential tremor.  At preop was noted to be hyperkalemic with potassium of 5.3..  11/30/2023 admitted for scheduled left total knee arthroplasty.  Postoperatively had some lightheadedness and diaphoresis with ambulation.  Now having some issues with nausea.  Has been declining orthostatics and Zofran.  Blood work unremarkable except for glucose of 173.  Mild anticipated anemia.  Will encourage patient to receive symptomatic medications and monitor.     Assessment and Plan:    Status post left total knee arthroplasty  Postoperative management per orthopedics.  Nausea diaphoresis  Unclear etiology.  No significant abnormalities noted in blood work.  Denies any chest pain or shortness of breath.  Likely adverse reaction to his pain medications.  Encourage as needed symptomatic meds.  If continues to be difficult to control would not anticipate discharging later today.  Essential tremor  Continue propranolol home dose.  Peripheral edema  Hyperkalemia  Patient with mild elevation of potassium 5.3 on 11/15/2023.  PTA medication: Furosemide home dose  Repeat creatinine 1.09 and potassium 4.1.    Benign prostatic hypertrophy  Continue tamsulosin 0.4 mg every evening    Anticoagulation   Aspirin 325 daily per orthopedics.  Routine postoperative risk.    COVID-19 PCR not tested    Fluids: Saline lock  Pain meds: Per surgery  Therapy: Per surgery  Ames:Not present  Lines: None       Current Diet  Orders Placed This Encounter      Advance Diet as Tolerated: Regular Diet Adult      Discharge Instruction - Regular Diet Adult    Supplements  None    Barriers to Discharge: Diaphoresis, participation in therapy, nausea vomiting    Disposition: To be determined but suspect  "may not be discharged until 12/2    Clinically Significant Risk Factors Present on Admission                       # Overweight: Estimated body mass index is 25.1 kg/m  as calculated from the following:    Height as of this encounter: 1.803 m (5' 11\").    Weight as of this encounter: 81.6 kg (180 lb).       # Financial/Environmental Concerns: none         Interval History/Subjective:  Patient did well last evening denies any pain issues however this morning while working with therapy became very diaphoretic and now is complaining of significant nausea and vomiting.  Had been declining Zofran previously.  No shortness of breath.  No chest pain.  Family is present and supportive.  Questions answered to verbalized satisfaction.      Last 24H PRN:     oxyCODONE IR (ROXICODONE) half-tab 2.5 mg, 2.5 mg at 12/01/23 0745 **OR** oxyCODONE (ROXICODONE) tablet 5 mg, 5 mg at 12/01/23 0314    Physical Exam/Objective:  Temp:  [97.1  F (36.2  C)-98.5  F (36.9  C)] 98.3  F (36.8  C)  Pulse:  [] 76  Resp:  [15-27] 18  BP: (107-176)/(54-91) 116/73  SpO2:  [88 %-100 %] 92 %  Wt Readings from Last 4 Encounters:   11/30/23 81.6 kg (180 lb)     Body mass index is 25.1 kg/m .    Constitutional: awake, alert, cooperative, no apparent distress, and appears stated age and leaning forward in his bed just recently had an emesis.  Significant essential tremor noted.  ENT: Normocephalic, without obvious abnormality, atraumatic, external ears without lesions, oral pharynx with moist mucous membranes, tonsils without erythema or exudates, gums normal and good dentition.  Respiratory: No increased work of breathing, good air exchange, clear to auscultation bilaterally, no crackles or wheezing  Cardiovascular: Normal apical impulse, regular rate and rhythm, normal S1 and S2, no S3 or S4, and no murmur noted  GI: No scars, normal bowel sounds, soft, non-distended, non-tender, no masses palpated, no hepatosplenomegally  Skin: normal skin color, " "texture, turgor, no redness, warmth, or swelling, and no rashes  Musculoskeletal: Limited exam secondary to recent surgery but surgical dressing intact and no obvious deformity noted.  Good muscular tone. Tone is normal. no lower extremity pitting edema present  Neurologic: Cranial nerves II-XII are grossly intact. Sensory:  Sensory intact  Neuropsychiatric: General: normal, calm, and normal eye contact Level of consciousness: alert / normal Affect: normal Orientation: oriented to self, place, time and situation Memory and insight: normal, memory for past and recent events intact, and thought process normal      Medications:   Personally Reviewed.  Medications    lactated ringers 75 mL/hr at 11/30/23 2328      acetaminophen  975 mg Oral Q8H    aspirin  325 mg Oral Daily    polyethylene glycol  17 g Oral Daily    propranolol ER  60 mg Oral Daily    senna-docusate  1 tablet Oral BID    sodium chloride (PF)  3 mL Intracatheter Q8H    tamsulosin  0.4 mg Oral QPM    timolol maleate  1 drop Both Eyes Daily       Data reviewed today: I personally reviewed all new medications, labs, imaging/diagnostics reports over the past 24 hours. Pertinent findings include:    Imaging:   Recent Results (from the past 24 hour(s))   POC US Guidance Needle Placement    Narrative    Ultrasound was performed as guidance to an anesthesia procedure.  Click   \"PACS images\" hyperlink below to view any stored images.  For specific   procedure details, view procedure note authored by anesthesia.   XR Knee Port Left 1/2 Views    Narrative    EXAM: XR KNEE PORT LEFT 1/2 VIEWS  LOCATION: [facility]  DATE: 11/30/2023    INDICATION: Post Op Total Knee  COMPARISON: None.      Impression    IMPRESSION: Postoperative changes of left total knee arthroplasty and patellar resurfacing. Components appear well seated. Post procedural air within the joint and surrounding soft tissues. Additional postoperative changes to the fibular head " with screw   fixation. No evidence for acute fracture.       Labs:  XR Knee Port Left 1/2 Views   Final Result   IMPRESSION: Postoperative changes of left total knee arthroplasty and patellar resurfacing. Components appear well seated. Post procedural air within the joint and surrounding soft tissues. Additional postoperative changes to the fibular head with screw    fixation. No evidence for acute fracture.      POC US Guidance Needle Placement   Final Result        Recent Results (from the past 24 hour(s))   Potassium - Pre-Op    Collection Time: 11/30/23 12:53 PM   Result Value Ref Range    Potassium 3.7 3.4 - 5.3 mmol/L   Glucose by meter    Collection Time: 12/01/23  5:24 AM   Result Value Ref Range    GLUCOSE BY METER POCT 114 (H) 70 - 99 mg/dL   Hemoglobin    Collection Time: 12/01/23  8:16 AM   Result Value Ref Range    Hemoglobin 11.1 (L) 13.3 - 17.7 g/dL   Creatinine    Collection Time: 12/01/23  8:16 AM   Result Value Ref Range    Creatinine 1.08 0.67 - 1.17 mg/dL    GFR Estimate 64 >60 mL/min/1.73m2       Pending Labs:  Unresulted Labs Ordered in the Past 30 Days of this Admission       Date and Time Order Name Status Description    12/1/2023  8:53 AM Phosphorus In process     12/1/2023  8:53 AM Magnesium In process     12/1/2023  8:53 AM Basic metabolic panel In process               Jean-Pierre Gates MD  [facility] Medicine  [facility]  Phone: #[phone]

## 2023-12-01 NOTE — ANESTHESIA POSTPROCEDURE EVALUATION
Patient: Gasper Houston    Procedure: Procedure(s):  LEFT TOTAL KNEE ARTHROPLASTY, AND HARDWARE REMOVAL       Anesthesia Type:  Spinal    Note:  Disposition: Inpatient   Postop Pain Control: Uneventful            Sign Out: Well controlled pain   PONV: No   Neuro/Psych: Uneventful            Sign Out: Acceptable/Baseline neuro status   Airway/Respiratory: Uneventful            Sign Out: Acceptable/Baseline resp. status   CV/Hemodynamics: Uneventful            Sign Out: Acceptable CV status; No obvious hypovolemia; No obvious fluid overload   Other NRE: NONE   DID A NON-ROUTINE EVENT OCCUR? No           Last vitals:  Vitals Value Taken Time   /79 11/30/23 1750   Temp 36.3  C (97.4  F) 11/30/23 1750   Pulse 88 11/30/23 1752   Resp 21 11/30/23 1751   SpO2 94 % 11/30/23 1752   Vitals shown include unfiled device data.    Electronically Signed By: Khari Fuchs MD  November 30, 2023  6:24 PM
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
## 2023-12-01 NOTE — PLAN OF CARE
Discharge instructions reviewed with sons and patient. All questions answered. Rechecked blood pressure with good results. Discharged to home.

## 2023-12-01 NOTE — PROGRESS NOTES
"[facility] Progress Note      Post-operative Day: 1 Day Post-Op    Procedure(s):  LEFT TOTAL KNEE ARTHROPLASTY, AND HARDWARE REMOVAL      Subjective: Patient seen up walking in room with assistance. Pain well controlled. Passing gas, voiding. Tolerating regular diet. Denies chest pain or shortness of breath. CMS LLE intact. Hgb pending, denies dizziness or feeling lightheaded. Drainage from lateral incision improved this AM.     Pain: minimal  Chest pain, SOB:  No      Objective:  Blood pressure 112/54, pulse 102, temperature 98.3  F (36.8  C), temperature source Oral, resp. rate 18, height 1.803 m (5' 11\"), weight 81.6 kg (180 lb), SpO2 92%.    Patient Vitals for the past 24 hrs:   BP Temp Temp src Pulse Resp SpO2 Height Weight   12/01/23 0739 112/54 98.3  F (36.8  C) Oral 102 18 92 % -- --   12/01/23 0457 -- -- -- -- -- 93 % -- --   12/01/23 0452 -- -- -- -- -- (!) 88 % -- --   12/01/23 0044 -- -- -- -- -- 94 % -- --   12/01/23 0043 -- -- -- -- -- (!) 89 % -- --   11/30/23 2353 115/62 98.5  F (36.9  C) Oral 89 16 94 % -- --   11/30/23 2100 107/60 97.5  F (36.4  C) Oral 107 17 92 % -- --   11/30/23 2000 134/75 98.4  F (36.9  C) Oral 102 17 93 % -- --   11/30/23 1900 (!) 133/91 98  F (36.7  C) Axillary 110 16 94 % -- --   11/30/23 1830 (!) 151/72 98  F (36.7  C) Axillary 107 17 94 % -- --   11/30/23 1800 122/79 97.6  F (36.4  C) Oral 90 17 94 % -- --   11/30/23 1750 (!) 145/79 97.4  F (36.3  C) Temporal 84 19 93 % -- --   11/30/23 1740 (!) 141/79 -- -- 86 21 95 % -- --   11/30/23 1730 (!) 141/77 -- -- 90 24 94 % -- --   11/30/23 1720 128/72 -- -- 95 22 96 % -- --   11/30/23 1713 135/76 97.1  F (36.2  C) Temporal 85 15 100 % -- --   11/30/23 1430 (!) 176/85 -- -- 87 16 97 % -- --   11/30/23 1420 (!) 144/75 -- -- 91 18 96 % -- --   11/30/23 1410 120/60 -- -- 86 18 95 % -- --   11/30/23 1400 (!) 157/89 -- -- 85 20 99 % -- --   11/30/23 1350 (!) 152/86 -- -- 84 21 100 % -- --   11/30/23 1345 (!) 167/83 -- " "-- 84 27 100 % -- --   11/30/23 1244 (!) 140/83 98.5  F (36.9  C) Temporal 90 16 96 % 1.803 m (5' 11\") 81.6 kg (180 lb)       Wt Readings from Last 4 Encounters:   11/30/23 81.6 kg (180 lb)         Motor function, sensation, and circulation intact   Yes  Wound status: incisions are clean dry and intact.  Yes, lateral incision improved  Calf tenderness: Bilateral  No    Pertinent Labs   Lab Results: personally reviewed.     No results for input(s): \"INR\", \"WBC\", \"HGB\", \"HCT\", \"MCV\", \"PLT\", \"NA\", \"CRP\" in the last 88264 hours.    Invalid input(s): \"K\", \"GLU\", \"SEDRATE\"    Plan: Anticoagulation protocol:  mg daily  x 6 weeks            Pain medications: oxycodone and tylenol            Weight bearing status:  WBAT            Disposition: Anticipate home today pending PT/OT              Continue cares and rehabilitation     Addendum 8:45-  Patient seen by PT, became lightheaded and dizzy, near vasovagal event. BP remains WNL, patient discharge pending medical stability, RN to touch base with [facility].     Report completed by:  Vonnie Loredo PA-C  Date: 12/1/2023  Time: 8:11 AM    "

## 2023-12-01 NOTE — PLAN OF CARE
Note for RN cares provided 1900 - 2300:  Patient vital signs are at baseline: Yes  Patient able to ambulate as they were prior to admission or with assist devices provided by therapies during their stay:  Yes Up with A x 1, FWW GB ambulated in room then returned to bed, states wants to walk in the morning with therapy.  Patient MUST void prior to discharge:  Yes  Patient able to tolerate oral intake:  Yes  Pain has adequate pain control using Oral analgesics:  Yes  Does patient have an identified :  Yes  Has goal D/C date and time been discussed with patient:  Yes      Reinforced lateral aspect of left knee with ABD pad and ace wrap, updated on call Dr. Magen Tenorio of [facility] regarding sanguinous saturation and reinforcement of dressing. CMS: LLE cool, +2 pedal pulse; RLE warm, +3 pedal pulse. Pain managed with PRN oxycodone, scheduled tylenol.

## 2023-12-01 NOTE — PLAN OF CARE
Goal Outcome Evaluation:  Patient vital signs are at baseline: Yes  Patient able to ambulate as they were prior to admission or with assist devices provided by therapies during their stay:  Yes  Patient MUST void prior to discharge:  Yes  Patient able to tolerate oral intake:  Yes  Pain has adequate pain control using Oral analgesics:  Yes  Does patient have an identified :  Yes  Has goal D/C date and time been discussed with patient:  Yes   Patient is al\alert and oriented X 4. Pain controlled with scheduled and PRN pain medications. Ambulated to the bathroom and voided. Utilized ice packs for comfort. Tolerated IV antibiotics. IV saline locked. Was on Oxygen at night. VSS. Chair alarm activated for safety.

## 2023-12-01 NOTE — CONSULTS
Care Management Initial Consult    General Information  Assessment completed with: Patient  Type of CM/SW Visit: Initial Assessment    Primary Care Provider verified and updated as needed:     Readmission within the last 30 days: no previous admission in last 30 days      Reason for Consult: discharge planning           Communication Assessment  Patient's communication style: spoken language (English or Bilingual)    Hearing Difficulty or Deaf: yes   Wear Glasses or Blind: yes    Cognitive  Cognitive/Neuro/Behavioral: WDL                      Living Environment:   People in home: spouse     Current living Arrangements:  [facility]     Able to return to prior arrangements: yes       Family/Social Support:  Care provided by: self  Provides care for: no one  Marital Status:              Description of Support System:  family       Current Resources:   Patient receiving home care services: No  Community Resources: None  Equipment currently used at home: cane, straight  Supplies currently used at home: None    Employment/Financial:  Financial Concerns: none          Functional Status:  Prior to admission patient needed assistance:   Dependent ADLs:: Ambulation-cane  Dependent IADLs:: Transportation       Mental Health Status:  Mental Health Status: No Current Concerns       Chemical Dependency Status:  Chemical Dependency Status: No Current Concerns                         Additional Information:  CM reviewed chart. CM met with patient- introduced self and role of CM. Assessed. Lives at [facility]  with his wife of 60+ years. Patient would like to do PT at [facility]. Patient receiving home care services: No. Community Resources: None. Equipment currently used at home: cane, straight. Supplies currently used at home: None. Prior to admission: Dependent ADLs:: Ambulation-cane. Dependent IADLs:: Transportation. Patient no longer drives and wife doesn't drive.  Family will provide transportation home at discharge.       CM consult for discharge planning.       Myra De La Cruz RN

## 2023-12-01 NOTE — PROVIDER NOTIFICATION
Paged Dr. Gates regarding patient getting dizzy and diaphoretic in the hallway while walking with PT. Will obtain orthostatic vital signs. New nausea once back in chair. Vitals were stable. Patient stated he felt like he did too much this morning.

## 2024-08-19 ENCOUNTER — APPOINTMENT (OUTPATIENT)
Dept: CT IMAGING | Facility: CLINIC | Age: 89
End: 2024-08-19
Attending: STUDENT IN AN ORGANIZED HEALTH CARE EDUCATION/TRAINING PROGRAM
Payer: COMMERCIAL

## 2024-08-19 ENCOUNTER — HOSPITAL ENCOUNTER (EMERGENCY)
Facility: CLINIC | Age: 89
Discharge: HOME | End: 2024-08-19
Attending: STUDENT IN AN ORGANIZED HEALTH CARE EDUCATION/TRAINING PROGRAM | Admitting: STUDENT IN AN ORGANIZED HEALTH CARE EDUCATION/TRAINING PROGRAM
Payer: COMMERCIAL

## 2024-08-19 VITALS
BODY MASS INDEX: 25.05 KG/M2 | OXYGEN SATURATION: 94 % | HEIGHT: 70 IN | TEMPERATURE: 98.2 F | SYSTOLIC BLOOD PRESSURE: 146 MMHG | RESPIRATION RATE: 20 BRPM | DIASTOLIC BLOOD PRESSURE: 74 MMHG | WEIGHT: 175 LBS | HEART RATE: 75 BPM

## 2024-08-19 DIAGNOSIS — R26.89 BALANCE PROBLEMS: ICD-10-CM

## 2024-08-19 DIAGNOSIS — S22.009B: Primary | ICD-10-CM
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
## 2024-08-19 LAB
ANION GAP SERPL CALCULATED.3IONS-SCNC: 16 MMOL/L (ref 7–15)
ATRIAL RATE - MUSE: 66 BPM
BASOPHILS # BLD AUTO: 0 10E3/UL (ref 0–0.2)
BASOPHILS NFR BLD AUTO: 0 %
BUN SERPL-MCNC: 23.5 MG/DL (ref 8–23)
CALCIUM SERPL-MCNC: 8.4 MG/DL (ref 8.8–10.4)
CHLORIDE SERPL-SCNC: 103 MMOL/L (ref 98–107)
CREAT SERPL-MCNC: 1.01 MG/DL (ref 0.67–1.17)
DIASTOLIC BLOOD PRESSURE - MUSE: 65 MMHG
EGFRCR SERPLBLD CKD-EPI 2021: 69 ML/MIN/1.73M2
EOSINOPHIL # BLD AUTO: 0.1 10E3/UL (ref 0–0.7)
EOSINOPHIL NFR BLD AUTO: 1 %
ERYTHROCYTE [DISTWIDTH] IN BLOOD BY AUTOMATED COUNT: 13.3 % (ref 10–15)
GLUCOSE SERPL-MCNC: 90 MG/DL (ref 70–99)
HCO3 SERPL-SCNC: 23 MMOL/L (ref 22–29)
HCT VFR BLD AUTO: 38.9 % (ref 40–53)
HGB BLD-MCNC: 12.9 G/DL (ref 13.3–17.7)
IMM GRANULOCYTES # BLD: 0 10E3/UL
IMM GRANULOCYTES NFR BLD: 0 %
INTERPRETATION ECG - MUSE: NORMAL
LYMPHOCYTES # BLD AUTO: 1 10E3/UL (ref 0.8–5.3)
LYMPHOCYTES NFR BLD AUTO: 14 %
MCH RBC QN AUTO: 31.3 PG (ref 26.5–33)
MCHC RBC AUTO-ENTMCNC: 33.2 G/DL (ref 31.5–36.5)
MCV RBC AUTO: 94 FL (ref 78–100)
MONOCYTES # BLD AUTO: 0.6 10E3/UL (ref 0–1.3)
MONOCYTES NFR BLD AUTO: 9 %
NEUTROPHILS # BLD AUTO: 5.3 10E3/UL (ref 1.6–8.3)
NEUTROPHILS NFR BLD AUTO: 76 %
NRBC # BLD AUTO: 0 10E3/UL
NRBC BLD AUTO-RTO: 0 /100
P AXIS - MUSE: 54 DEGREES
PLATELET # BLD AUTO: 187 10E3/UL (ref 150–450)
POTASSIUM SERPL-SCNC: 4.6 MMOL/L (ref 3.4–5.3)
PR INTERVAL - MUSE: 246 MS
QRS DURATION - MUSE: 94 MS
QT - MUSE: 370 MS
QTC - MUSE: 387 MS
R AXIS - MUSE: -9 DEGREES
RBC # BLD AUTO: 4.12 10E6/UL (ref 4.4–5.9)
SODIUM SERPL-SCNC: 142 MMOL/L (ref 135–145)
SYSTOLIC BLOOD PRESSURE - MUSE: 134 MMHG
T AXIS - MUSE: 14 DEGREES
VENTRICULAR RATE- MUSE: 66 BPM
WBC # BLD AUTO: 7 10E3/UL (ref 4–11)

## 2024-08-19 PROCEDURE — 72128 CT CHEST SPINE W/O DYE: CPT

## 2024-08-19 PROCEDURE — 99285 EMERGENCY DEPT VISIT HI MDM: CPT | Mod: 25

## 2024-08-19 PROCEDURE — 70450 CT HEAD/BRAIN W/O DYE: CPT

## 2024-08-19 PROCEDURE — 12001 RPR S/N/AX/GEN/TRNK 2.5CM/<: CPT

## 2024-08-19 PROCEDURE — 80048 BASIC METABOLIC PNL TOTAL CA: CPT | Performed by: STUDENT IN AN ORGANIZED HEALTH CARE EDUCATION/TRAINING PROGRAM

## 2024-08-19 PROCEDURE — 250N000013 HC RX MED GY IP 250 OP 250 PS 637: Performed by: STUDENT IN AN ORGANIZED HEALTH CARE EDUCATION/TRAINING PROGRAM

## 2024-08-19 PROCEDURE — 93005 ELECTROCARDIOGRAM TRACING: CPT | Performed by: STUDENT IN AN ORGANIZED HEALTH CARE EDUCATION/TRAINING PROGRAM

## 2024-08-19 PROCEDURE — 85025 COMPLETE CBC W/AUTO DIFF WBC: CPT | Performed by: STUDENT IN AN ORGANIZED HEALTH CARE EDUCATION/TRAINING PROGRAM

## 2024-08-19 PROCEDURE — 36415 COLL VENOUS BLD VENIPUNCTURE: CPT | Performed by: STUDENT IN AN ORGANIZED HEALTH CARE EDUCATION/TRAINING PROGRAM

## 2024-08-19 RX ORDER — CEPHALEXIN 500 MG/1
500 CAPSULE ORAL 3 TIMES DAILY
Qty: 21 CAPSULE | Refills: 0 | Status: SHIPPED | OUTPATIENT
Start: 2024-08-19 | End: 2024-08-26

## 2024-08-19 RX ORDER — CEPHALEXIN 500 MG/1
500 CAPSULE ORAL ONCE
Status: COMPLETED | OUTPATIENT
Start: 2024-08-19 | End: 2024-08-19

## 2024-08-19 RX ADMIN — CEPHALEXIN 500 MG: 500 CAPSULE ORAL at 14:24

## 2024-08-19 ASSESSMENT — ACTIVITIES OF DAILY LIVING (ADL)
ADLS_ACUITY_SCORE: 38

## 2024-08-19 ASSESSMENT — COLUMBIA-SUICIDE SEVERITY RATING SCALE - C-SSRS
6. HAVE YOU EVER DONE ANYTHING, STARTED TO DO ANYTHING, OR PREPARED TO DO ANYTHING TO END YOUR LIFE?: NO
1. IN THE PAST MONTH, HAVE YOU WISHED YOU WERE DEAD OR WISHED YOU COULD GO TO SLEEP AND NOT WAKE UP?: NO
2. HAVE YOU ACTUALLY HAD ANY THOUGHTS OF KILLING YOURSELF IN THE PAST MONTH?: NO

## 2024-08-19 NOTE — ED PROVIDER NOTES
EMERGENCY DEPARTMENT ENCOUNTER       ED Course & Medical Decision Making     ED Course:  11:09 AM I met with the patient, obtained history, performed an initial exam, and discussed options and plan for diagnostics and treatment here in the ED.  1:02 PM I spoke to radiology regarding the patient.  1:21 I spoke to neurosurgery, Dr. Mercado, regarding the patient.   1:36 PM I updated the patient.  1:52 PM I performed a laceration repair for the patient.   2:09 PM Reevaluated and updated the patient with findings. We discussed the plan for discharge and the patient is agreeable. Reviewed supportive cares, symptomatic treatment, outpatient follow up, and reasons to return to the Emergency Department. Patient to be discharged by ED RN.     Final Impression  89 year old male presents for evaluation of a mechanical fall at home.  Patient reports that for the last several years he feels that he is intermittently falling backwards, often has to brace himself and objects behind him, describes it happening multiple times per week, though has been increasing in the last few months he feels.  Patient denies striking his head or standing LOC, patient is 89 years old, this sounds like he is fairly active, totally cognitively engaged during interview, appears to be a fairly accurate and thorough historian.  Reports that today while he was walking around the house felt that he was beginning to tip backwards as he frequently does, typically tries to aim for something that he can brace himself on such as a nearby wall or chair, though this time he fell and struck his back on a nearby cabinet.  Evaluation patient has an 8 mm laceration roughly midline in his low thoracic spine, does have some mild tenderness to palpation of this area, though his neurologic exam is otherwise totally unremarkable, good strength in all 4 extremities, able to straight leg raise, no focal neurologic deficits.  CT of the head unremarkable, the thoracic spine  scans show spinous process fracture of the [id number] vertebrae likely sustained during his mechanical fall earlier when he struck his back on the nearby cabinet.  8 mm laceration repaired as documented below, roughly in the area of his T11 vertebrae.  Did discuss with patient's wife as well as daughter at bedside, reviewed images discussing the spinous process fractures, discussed my concerns for his repetitive falls at home, discussed whether they feel safe discharging home, wife and patient stated that they did feel safe discharging home and following up as an outpatient.  Did speak with neurosurgery regarding the thoracic spinous process fractures, no operative intervention, no need to follow-up in clinic from their standpoint.  Discussed with patient's wife and daughter that I do think it is imperative that he follows up in the clinic mainly for suture removal in about 10-14 days, though also to have further evaluation for his repeated falls, may have a peripheral neuropathy versus balance component, does have a history of schwannoma s/p resection in 2010 that may be contributing to some of his balance issues.  Does not think he has ever seen neurology for balance issues.  Will take his primary did [facility], the patient was never actually seen there.  Did discuss that they should follow-up with her primary doctor to discuss getting further workup for his balance issues and repetitive falling backwards. all questions answered, will discharge home with family.  First dose of Keflex given, rate of Keflex sent to pharmacy electronically.    Prior to making a final disposition on this patient the results of patient's tests and other diagnostic studies were discussed with the patient. All questions were answered. Patient expressed understanding of the plan and was amenable to it.    Medical Decision Making    History:  Supplemental history from: .  Wife,  daughter  External Record(s) reviewed as documented below;  4/22/24, [facility] clinic note, seen for annual Medicare wellness exam, they discussed tremor, postvoid dribbling, insomnia and other medical issues.    Work Up:  Chart documentation includes differential considered and any EKGs or imaging independently interpreted by provider, where specified.  DDx considered but not limited to: Skull fracture, thoracic spine fracture, subdural hematoma, anemia, electrolyte derangements  Considered admission / inpatient observation / escalation of cares for: Weakness, multiple falls, concern for possible safety at home    Complicating factors:  Care impacted by chronic illness: Essential tremor, insomnia, BPH, stage III CKD  Care affected by social determinants of health: Access to Medical Care    Disposition considerations: Discharge. I prescribed additional prescription strength medication(s) as charted. I considered admission, but discharged patient after significant clinical improvement.    Adult Minor Head Trauma: The patient is MODERATE of HIGH risk for traumatic brain jury, this Head CT was ordered based on the following risk factors: Age 65 years or older    Medications   cephALEXin (KEFLEX) capsule 500 mg (has no administration in time range)     New Prescriptions    CEPHALEXIN (KEFLEX) 500 MG CAPSULE    Take 1 capsule (500 mg) by mouth 3 times daily for 7 days     Modified Medications    No medications on file     Final Impression     1. Open fracture of spinous process of thoracic vertebra, initial encounter (H)    2. Balance problems    3. Fall, initial encounter      Chief Complaint     Chief Complaint   Patient presents with    Fall     HPI     Gasper Houston is a 89 year old male who presents for evaluation of fall.     The patient reports experiencing a fall this morning while reaching for a chair and missing it. He ultimately hit his back on a filing cabinet and began bleeding. No head injuries, no  "loss of consciousness. His wife was concerned about the patient's frequent falls and called for the ED. The patient believes his falls have been occurring for the past 3-4 years due a syndrome he developed of falling backwards. However, he states that he can cite a fall 10 years ago. The patient's falls are his biggest concern. The patient reportedly falls once every couple of days, but he is sometimes able to catch himself using something such as a wall. He is unsure of how often he directly falls on the ground, but he estimates a couple of times a week. No known neuropathy in his feet.    The patient lives in an apartment with his wife. He does not have assisted living services. His right leg is believed to be usually swollen.     I, Maria E Hernandez am serving as a scribe to document services personally performed by Dr. Efraín Solomon MD, based on my observation and the provider's statements to me. I, Dr. Efraín Solomon MD attest that Maria E Hernandez is acting in a scribe capacity, has observed my performance of the services and has documented them in accordance with my direction.    Physical Exam     /66   Pulse 67   Temp 98.2  F (36.8  C) (Oral)   Resp 19   Ht 1.778 m (5' 10\")   Wt 79.4 kg (175 lb)   SpO2 96%   BMI 25.11 kg/m    Constitutional: Awake, alert, in no acute distress.  Head: Normocephalic, atraumatic.  ENT: Mucous membranes moist.  Eyes: Conjunctiva normal.  Respiratory: Respirations even, unlabored, in no acute respiratory distress.  Cardiovascular: Regular rate and rhythm. Good peripheral perfusion.  +1 pitting edema of the right lower extremity, trace of the left lower extremity.  GI: Abdomen soft, non-tender.  Musculoskeletal: Moves all 4 extremities equally.  Able to plantarflex and dorsiflex bilaterally, able to straight leg raise bilaterally, good strength in bilateral upper extremities, able to pull self to a seated position in bed without significant or appreciable back " pain.  Integument: Warm, dry.  8 mm punctate laceration to the low thoracic spine, slight oozing of blood.  Neurologic: Alert & oriented x 3. Normal speech. Grossly normal motor and sensory function. No focal deficits noted.  Psychiatric: Normal mood    Labs & Imaging     Imaging reviewed and independently interpreted as below;   CT thoracic spine images reviewed, has spinous process fractures of [id number]    Results for orders placed or performed during the hospital encounter of 08/19/24   Head CT w/o contrast    Impression    IMPRESSION:  1.  No CT evidence for acute intracranial process.  2.  Brain atrophy and presumed chronic microvascular ischemic changes as above.   CT Thoracic Spine w/o Contrast    Impression    IMPRESSION:  1.  Acute comminuted fractures of the T10 and T11 spinous processes.  2.  Acute nondisplaced fracture of the superior T12 spinous process tip.    Results discussed with Dr. Solomon on 8/19/2024 1:10 PM CDT.     Basic metabolic panel   Result Value Ref Range    Sodium 142 135 - 145 mmol/L    Potassium 4.6 3.4 - 5.3 mmol/L    Chloride 103 98 - 107 mmol/L    Carbon Dioxide (CO2) 23 22 - 29 mmol/L    Anion Gap 16 (H) 7 - 15 mmol/L    Urea Nitrogen 23.5 (H) 8.0 - 23.0 mg/dL    Creatinine 1.01 0.67 - 1.17 mg/dL    GFR Estimate 69 >60 mL/min/1.73m2    Calcium 8.4 (L) 8.8 - 10.4 mg/dL    Glucose 90 70 - 99 mg/dL   CBC with platelets and differential   Result Value Ref Range    WBC Count 7.0 4.0 - 11.0 10e3/uL    RBC Count 4.12 (L) 4.40 - 5.90 10e6/uL    Hemoglobin 12.9 (L) 13.3 - 17.7 g/dL    Hematocrit 38.9 (L) 40.0 - 53.0 %    MCV 94 78 - 100 fL    MCH 31.3 26.5 - 33.0 pg    MCHC 33.2 31.5 - 36.5 g/dL    RDW 13.3 10.0 - 15.0 %    Platelet Count 187 150 - 450 10e3/uL    % Neutrophils 76 %    % Lymphocytes 14 %    % Monocytes 9 %    % Eosinophils 1 %    % Basophils 0 %    % Immature Granulocytes 0 %    NRBCs per 100 WBC 0 <1 /100    Absolute Neutrophils 5.3 1.6 - 8.3 10e3/uL    Absolute  Lymphocytes 1.0 0.8 - 5.3 10e3/uL    Absolute Monocytes 0.6 0.0 - 1.3 10e3/uL    Absolute Eosinophils 0.1 0.0 - 0.7 10e3/uL    Absolute Basophils 0.0 0.0 - 0.2 10e3/uL    Absolute Immature Granulocytes 0.0 <=0.4 10e3/uL    Absolute NRBCs 0.0 10e3/uL       EKG     EKG reviewed and independently interpreted as below;  Sinus rhythm with first-degree AV block, rate 66.  .  QRS 94.  QTc 387.  No STEMI.    Procedures     PROCEDURE: Laceration Repair   INDICATIONS: Laceration   PROCEDURE PROVIDER: Dr Efraín Solomon   SITE: Back in the Low T-Spine   TYPE/SIZE: simple, clean, and no foreign body visualized  0.8 cm (total length)   FUNCTIONAL ASSESSMENT: Distal sensation, circulation, and motor intact   MEDICATION: 4 mLs of 1% Lidocaine with epinephrine   PREPARATION: scrubbing with Normal saline   DEBRIDEMENT: no debridement   CLOSURE:  Superficial layer closed with 2 stitches of 5-0 Ethilon simple interrupted    Total number of sutures/staples placed: 2        Efraín Solomon MD  08/19/24 [id number]

## 2024-08-19 NOTE — PROGRESS NOTES
[facility]  Telephone Note    Contacted by Dr. Solomon at [facility] ED.     89M presented after a fall. Patient has history of falls, follows with PCP and [facility].     Exam per ED:  Neuro intact  Denies back pain, radicular pain, numbness, or weakness     Imaging:  EXAM: CT THORACIC SPINE W/O CONTRAST  LOCATION: [facility]  DATE: 8/19/2024                                                         IMPRESSION:  1.  Acute comminuted fractures of the T10 and T11 spinous processes.  2.  Acute nondisplaced fracture of the superior T12 spinous process tip.    Plan:   -No surgical intervention planned at this time  -Follow up with our NSGY clinic as needed   -Continue follow-up with PCP and the [facility]     Discussed with Dr. Palomo Mercado, CNP  [facility]  Tel [phone]  Pager [phone]

## 2024-08-19 NOTE — ED TRIAGE NOTES
Arrives to ED via [facility] from home with c/o fall x2 today. Last fall at 1000 this morning. Struck thoracic spine on file cabinet. Has small puncture wound, oozing. Gauze applied. Denies thinners. Denies head trauma/LOC. 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      Triage Assessment (Adult)       Row Name 08/19/24 1100          Triage Assessment    Airway WDL WDL        Respiratory WDL    Respiratory WDL WDL        Skin Circulation/Temperature WDL    Skin Circulation/Temperature WDL WDL        Cardiac WDL    Cardiac WDL WDL        Peripheral/Neurovascular WDL    Peripheral Neurovascular WDL WDL        Cognitive/Neuro/Behavioral WDL    Cognitive/Neuro/Behavioral WDL WDL

## 2024-08-19 NOTE — DISCHARGE INSTRUCTIONS
You will need to follow-up with your primary physician Dr. Barnes for suture removal in about 10-14 days.  You should also talk to her about getting further workup for your balance issues, see if she thinks a referral to neurology would be warranted to further evaluate why you keep falling backwards.    Your CT scans showed spinous process fractures of your T10, T11, and T12 vertebrae.  These will heal with time on their own, they do not need any surgical treatment.

## (undated) DEVICE — GLOVE UNDER INDICATOR PI SZ 7.0 LF 41670

## (undated) DEVICE — SU STRATAFIX PDS PLUS 2 CTX SPIRAL L14 IN SXPP2B405

## (undated) DEVICE — SU STRATAFIX MONOCRYL 3-0 SPIRAL PS-2 30CM SXMP1B106

## (undated) DEVICE — GLOVE BIOGEL INDICATOR 7.5 LF 41675

## (undated) DEVICE — PLATE GROUNDING ADULT W/CORD 9165L

## (undated) DEVICE — SOL WATER IRRIG 1000ML BOTTLE 2F7114

## (undated) DEVICE — SUTURE MONOCRYL+ 2-0 CT-1 36" UNDYED MCP945H

## (undated) DEVICE — DRSG GAUZE 2X2" TRAY 1806

## (undated) DEVICE — CUSTOM PACK TOTAL KNEE SOP5BTKHEC

## (undated) DEVICE — SOL NACL 0.9% IRRIG 1000ML BOTTLE 2F7124

## (undated) DEVICE — DRSG AQUACEL AG HYDROFIBER  3.5X10" 422605

## (undated) DEVICE — GLOVE BIOGEL PI ULTRATOUCH G SZ 6.5 42165

## (undated) DEVICE — CAST PADDING 6" STERILE 9046S

## (undated) DEVICE — HOLDER LIMB VELCRO OR 0814-1533

## (undated) DEVICE — BLADE SAW SAGITTAL STRK WIDE 25.4X85X1.2MM 2108-151-000

## (undated) DEVICE — SOL NACL 0.9% INJ 1000ML BAG 2B1324X

## (undated) DEVICE — GLOVE BIOGEL PI ORTHOPRO SZ 7.5 47675

## (undated) DEVICE — DRSG TEGADERM 4X4 3/4" 1626W

## (undated) DEVICE — ATTUNE KNEE SYSTEM TIBIAL INSERT FIXED BEARING POSTERIOR STABILIZED 7 7MM AOX
Type: IMPLANTABLE DEVICE | Site: KNEE | Status: NON-FUNCTIONAL
Brand: ATTUNE

## (undated) DEVICE — KIT ATTUNE EPAK PIN SYSTEM 2544-00-111

## (undated) DEVICE — A3 SUPPLIES- SEE NURSING INFO PAGE

## (undated) DEVICE — DECANTER VIAL 2006S

## (undated) DEVICE — SPONGE LAP 12X12" X8425

## (undated) DEVICE — SU ETHIBOND 1 CT-1 30" X425H

## (undated) DEVICE — SUCTION MANIFOLD NEPTUNE 2 SYS 4 PORT 0702-020-000

## (undated) DEVICE — CUSTOM PACK TOTAL KNEE ACCESSORY SOP5BTAHEA

## (undated) RX ORDER — PROPOFOL 10 MG/ML
INJECTION, EMULSION INTRAVENOUS
Status: DISPENSED
Start: 2023-11-30

## (undated) RX ORDER — FENTANYL CITRATE-0.9 % NACL/PF 10 MCG/ML
PLASTIC BAG, INJECTION (ML) INTRAVENOUS
Status: DISPENSED
Start: 2023-11-30